# Patient Record
Sex: FEMALE | Race: ASIAN | Employment: UNEMPLOYED | URBAN - METROPOLITAN AREA
[De-identification: names, ages, dates, MRNs, and addresses within clinical notes are randomized per-mention and may not be internally consistent; named-entity substitution may affect disease eponyms.]

---

## 2024-01-10 PROBLEM — R10.13 ABDOMINAL PAIN, EPIGASTRIC: Status: ACTIVE | Noted: 2024-01-10

## 2024-01-10 PROBLEM — R11.0 NAUSEA: Status: ACTIVE | Noted: 2024-01-10

## 2024-01-10 PROBLEM — K21.9 GASTROESOPHAGEAL REFLUX DISEASE: Status: ACTIVE | Noted: 2024-01-10

## 2024-01-18 ENCOUNTER — HOSPITAL ENCOUNTER (EMERGENCY)
Age: 39
Discharge: HOME | End: 2024-01-18
Attending: EMERGENCY MEDICINE
Payer: COMMERCIAL

## 2024-01-18 ENCOUNTER — APPOINTMENT (OUTPATIENT)
Dept: CT IMAGING | Age: 39
End: 2024-01-18
Attending: NURSE PRACTITIONER
Payer: COMMERCIAL

## 2024-01-18 VITALS
HEART RATE: 61 BPM | HEIGHT: 64 IN | RESPIRATION RATE: 16 BRPM | TEMPERATURE: 98 F | BODY MASS INDEX: 16.73 KG/M2 | WEIGHT: 98 LBS | SYSTOLIC BLOOD PRESSURE: 99 MMHG | OXYGEN SATURATION: 100 % | DIASTOLIC BLOOD PRESSURE: 69 MMHG

## 2024-01-18 DIAGNOSIS — N20.0 KIDNEY STONE: Primary | ICD-10-CM

## 2024-01-18 DIAGNOSIS — R10.9 ABDOMINAL PAIN, ACUTE: ICD-10-CM

## 2024-01-18 LAB
ALBUMIN SERPL-MCNC: 3.7 G/DL (ref 3.4–5)
ALBUMIN/GLOB SERPL: 1 {RATIO} (ref 1–2)
ALP LIVER SERPL-CCNC: 59 U/L
ALT SERPL-CCNC: 34 U/L
ANION GAP SERPL CALC-SCNC: 7 MMOL/L (ref 0–18)
AST SERPL-CCNC: 23 U/L (ref 15–37)
BASOPHILS # BLD AUTO: 0.04 X10(3) UL (ref 0–0.2)
BASOPHILS NFR BLD AUTO: 0.5 %
BILIRUB SERPL-MCNC: 0.3 MG/DL (ref 0.1–2)
BUN BLD-MCNC: 12 MG/DL (ref 9–23)
CALCIUM BLD-MCNC: 9.1 MG/DL (ref 8.5–10.1)
CHLORIDE SERPL-SCNC: 108 MMOL/L (ref 98–112)
CO2 SERPL-SCNC: 24 MMOL/L (ref 21–32)
CREAT BLD-MCNC: 0.74 MG/DL
EGFRCR SERPLBLD CKD-EPI 2021: 107 ML/MIN/1.73M2 (ref 60–?)
EOSINOPHIL # BLD AUTO: 0.13 X10(3) UL (ref 0–0.7)
EOSINOPHIL NFR BLD AUTO: 1.7 %
ERYTHROCYTE [DISTWIDTH] IN BLOOD BY AUTOMATED COUNT: 12.3 %
GLOBULIN PLAS-MCNC: 3.7 G/DL (ref 2.8–4.4)
GLUCOSE BLD-MCNC: 95 MG/DL (ref 70–99)
HCT VFR BLD AUTO: 38 %
HGB BLD-MCNC: 12.5 G/DL
IMM GRANULOCYTES # BLD AUTO: 0.02 X10(3) UL (ref 0–1)
IMM GRANULOCYTES NFR BLD: 0.3 %
LIPASE SERPL-CCNC: 25 U/L (ref 13–75)
LYMPHOCYTES # BLD AUTO: 0.97 X10(3) UL (ref 1–4)
LYMPHOCYTES NFR BLD AUTO: 12.5 %
MCH RBC QN AUTO: 29 PG (ref 26–34)
MCHC RBC AUTO-ENTMCNC: 32.9 G/DL (ref 31–37)
MCV RBC AUTO: 88.2 FL
MONOCYTES # BLD AUTO: 0.31 X10(3) UL (ref 0.1–1)
MONOCYTES NFR BLD AUTO: 4 %
NEUTROPHILS # BLD AUTO: 6.29 X10 (3) UL (ref 1.5–7.7)
NEUTROPHILS # BLD AUTO: 6.29 X10(3) UL (ref 1.5–7.7)
NEUTROPHILS NFR BLD AUTO: 81 %
OSMOLALITY SERPL CALC.SUM OF ELEC: 288 MOSM/KG (ref 275–295)
PLATELET # BLD AUTO: 274 10(3)UL (ref 150–450)
POCT INFLUENZA A: NEGATIVE
POCT INFLUENZA B: NEGATIVE
POTASSIUM SERPL-SCNC: 4.2 MMOL/L (ref 3.5–5.1)
PROT SERPL-MCNC: 7.4 G/DL (ref 6.4–8.2)
RBC # BLD AUTO: 4.31 X10(6)UL
SARS-COV-2 RNA RESP QL NAA+PROBE: NOT DETECTED
SODIUM SERPL-SCNC: 139 MMOL/L (ref 136–145)
WBC # BLD AUTO: 7.8 X10(3) UL (ref 4–11)

## 2024-01-18 PROCEDURE — C9113 INJ PANTOPRAZOLE SODIUM, VIA: HCPCS | Performed by: NURSE PRACTITIONER

## 2024-01-18 PROCEDURE — 85025 COMPLETE CBC W/AUTO DIFF WBC: CPT

## 2024-01-18 PROCEDURE — 87502 INFLUENZA DNA AMP PROBE: CPT | Performed by: NURSE PRACTITIONER

## 2024-01-18 PROCEDURE — 96361 HYDRATE IV INFUSION ADD-ON: CPT

## 2024-01-18 PROCEDURE — 80053 COMPREHEN METABOLIC PANEL: CPT

## 2024-01-18 PROCEDURE — 83690 ASSAY OF LIPASE: CPT

## 2024-01-18 PROCEDURE — 96375 TX/PRO/DX INJ NEW DRUG ADDON: CPT

## 2024-01-18 PROCEDURE — 96374 THER/PROPH/DIAG INJ IV PUSH: CPT

## 2024-01-18 PROCEDURE — 74177 CT ABD & PELVIS W/CONTRAST: CPT | Performed by: NURSE PRACTITIONER

## 2024-01-18 PROCEDURE — 99284 EMERGENCY DEPT VISIT MOD MDM: CPT

## 2024-01-18 RX ORDER — MORPHINE SULFATE 4 MG/ML
4 INJECTION, SOLUTION INTRAMUSCULAR; INTRAVENOUS ONCE
Status: COMPLETED | OUTPATIENT
Start: 2024-01-18 | End: 2024-01-18

## 2024-01-18 RX ORDER — ACETAMINOPHEN 500 MG
1000 TABLET ORAL ONCE
Status: COMPLETED | OUTPATIENT
Start: 2024-01-18 | End: 2024-01-18

## 2024-01-18 RX ORDER — ONDANSETRON 2 MG/ML
4 INJECTION INTRAMUSCULAR; INTRAVENOUS ONCE
Status: COMPLETED | OUTPATIENT
Start: 2024-01-18 | End: 2024-01-18

## 2024-01-18 NOTE — ED INITIAL ASSESSMENT (HPI)
Reports headache started last noc and abd pain and nausea since this.  Reports has vomited one time today.  Was sent from physicians immediate care.

## 2024-01-19 NOTE — DISCHARGE INSTRUCTIONS
Encourage fliud intake     Tylenol or ibuprofen for pain     Continue taking all medications as prescribed by your GI and your primary care doctor.     Follow up with your primary care MD in 2-3 days     Return to the ER with any worsening symptoms or concerns

## 2024-02-02 ENCOUNTER — HOSPITAL ENCOUNTER (OUTPATIENT)
Dept: MRI IMAGING | Age: 39
Discharge: HOME | End: 2024-02-02
Attending: Other
Payer: COMMERCIAL

## 2024-02-02 DIAGNOSIS — M79.7 FIBROMYALGIA: ICD-10-CM

## 2024-02-02 PROCEDURE — 70551 MRI BRAIN STEM W/O DYE: CPT | Performed by: OTHER

## 2024-03-31 NOTE — ED PROVIDER NOTES
Patient Seen in: [facility] In [place]    History     Chief Complaint   Patient presents with    Abdomen/Flank Pain    Nausea/Vomiting/Diarrhea    Headache     Stated Complaint: abdominal pain, nausea, headache all day    HPI    Patient complains of abdominal pain, nausea, and headache that began earlier today.   Pt reports that she has been havaing burning pain and discomfort in her abdomen since having an endoscopy earlier this week.  Reports that she was told she should be taking omeprazole.  .  Pain described as burning pain in the abdomen.  No significant epigastric pain.  Pain rated as 6/10.  Modifying factors include: pt tried Pantoprazole with little relief of symptoms.        Past Medical History:   Diagnosis Date    Abdominal distention     Abdominal pain     Back pain     Bloating     Flatulence/gas pain/belching     Nausea     Sleep disturbance     Uncomfortable fullness after meals     Weight loss        History reviewed. No pertinent surgical history.         No family history on file.    Social History     Socioeconomic History    Marital status:    Tobacco Use    Smoking status: Never    Smokeless tobacco: Never   Vaping Use    Vaping Use: Never used   Substance and Sexual Activity    Alcohol use: Never    Drug use: Never       Review of Systems    Positive for stated complaint: abdominal pain, nausea, headache all day  Other systems are as noted in HPI.  Constitutional and vital signs reviewed.      All other systems reviewed and negative except as noted above.    PSFH elements reviewed from today and agreed except as otherwise stated in HPI.    Physical Exam     ED Triage Vitals [01/18/24 1726]   /70   Pulse 90   Resp 20   Temp 97.8 °F (36.6 °C)   Temp src Temporal   SpO2 100 %   O2 Device None (Room air)       Current:BP 99/69   Pulse 61   Temp 97.8 °F (36.6 °C) (Temporal)   Resp 16   Ht 162.6 cm (5' 4\")   Wt 44.5 kg   LMP 01/13/2024   SpO2 100%   BMI  16.82 kg/m²   Pulse ox 100% on RA         Physical Exam  General Appearance: alert, no distress  Eyes: pupils equal and round no pallor or injection  ENT, Mouth: mucous membranes moist  Respiratory: there are no retractions, lungs are clear to auscultation  Cardiovascular: regular rate and rhythm    Gastrointestinal: soft, non tender, no mass, normal bowel sounds, no swelling, no ecchymosis, no pain at mcburney's point, negative conn sign.  Neurological: II-XII grossly intact  no focal deficits  Skin: warm and dry, no rashes.  Musculoskeletal: neck is supple non tender        Extremities are symmetrical, full range of motion  Psychiatric: patient is pleasant, there is no agitation         ED Course     Labs Reviewed   CBC W/ DIFFERENTIAL - Abnormal; Notable for the following components:       Result Value    Lymphocyte Absolute 0.97 (*)     All other components within normal limits   COMP METABOLIC PANEL (14) - Normal   LIPASE - Normal   POCT FLU TEST - Normal    Narrative:     This assay is a rapid molecular in vitro test utilizing nucleic acid amplification of influenza A and B viral RNA.   RAPID SARS-COV-2 BY PCR - Normal   CBC WITH DIFFERENTIAL WITH PLATELET    Narrative:     The following orders were created for panel order CBC With Differential With Platelet.  Procedure                               Abnormality         Status                     ---------                               -----------         ------                     CBC W/ DIFFERENTIAL[[id number]]          Abnormal            Final result                 Please view results for these tests on the individual orders.   RAINBOW DRAW LAVENDER   RAINBOW DRAW LIGHT GREEN     I have personally  reviewed available prior medical records for any recent pertinent discharge summaries/testing. Patient/family updated on results and plan, a verbalized understanding and agreement with the plan.  I explained to the patient that emergent conditions may arise and  to go to the ER for new, worsening or any persistent conditions. I've explained the importance of taking all medicatons as prescribed, follow up, and return precuations,  All questions answered.    Please note that this report has been produced using speech recognition software and may contain errors related to that system including, but not limited to, errors in grammar, punctuation, and spelling, as well as words and phrases that possibly may have been recognized inappropriately.  If there are any questions or concerns, contact the dictating provider for clarification.  MDM       Patient complains of abdominal pain, nausea, and headache that began earlier today.   Pt reports that she has been havaing burning pain and discomfort in her abdomen since having an endoscopy earlier this week.  Reports that she was told she should be taking omeprazole.  .  Pain described as burning pain in the abdomen.  No significant epigastric pain.  Pain rated as 6/10.  Modifying factors include: pt tried Pantoprazole with little relief of symptoms.     Differential diagnosis includes esophageal perforation, gastroenteritis, GERD, UTI    All labs were reviewed, CT and labs were discussed with the patient and .  Patient is feeling better after fluids, Tylenol.  I did discuss return and follow-up precautions.  Patient was discharged home in stable condition to follow-up with primary care physician and to return to the emergency department with any worsening symptoms or concerns  Radiology Interpretation:  CT ABDOMEN PELVIS IV CONTRAST, NO ORAL (ER)    Result Date: 1/18/2024  CONCLUSION:  1. No acute process. 2. Punctate nonobstructing stone within a right superior pole calyx measuring 2 mm.   LOCATION:  [facility]   Dictated by (CST): Carlton Colunga DO on 1/18/2024 at 7:56 PM     Finalized by (CST): Carlton Colunga DO on 1/18/2024 at 7:59 PM              Disposition and Plan     Clinical Impression:  1. Kidney stone    2. Abdominal  pain, acute        Disposition:  Discharge    Follow-up:  Benedicto Loya,   [address]  [address]  [facility] [id number] [phone]    Follow up        Medications Prescribed:  Discharge Medication List as of 1/18/2024  8:51 PM                     hard copy, drawn during this pregnancy

## 2024-04-23 ENCOUNTER — LAB ENCOUNTER (OUTPATIENT)
Dept: LAB | Age: 39
End: 2024-04-23
Attending: STUDENT IN AN ORGANIZED HEALTH CARE EDUCATION/TRAINING PROGRAM
Payer: COMMERCIAL

## 2024-06-17 ENCOUNTER — LAB ENCOUNTER (OUTPATIENT)
Dept: LAB | Age: 39
End: 2024-06-17
Attending: STUDENT IN AN ORGANIZED HEALTH CARE EDUCATION/TRAINING PROGRAM

## 2024-06-17 DIAGNOSIS — A04.8 H. PYLORI INFECTION: ICD-10-CM

## 2024-06-17 PROCEDURE — 83013 H PYLORI (C-13) BREATH: CPT

## 2024-06-18 LAB — H PYLORI BREATH TEST: POSITIVE

## 2024-08-22 ENCOUNTER — APPOINTMENT (OUTPATIENT)
Dept: CT IMAGING | Age: 39
End: 2024-08-22
Attending: NURSE PRACTITIONER
Payer: COMMERCIAL

## 2024-08-22 ENCOUNTER — HOSPITAL ENCOUNTER (EMERGENCY)
Age: 39
Discharge: HOME | End: 2024-08-22
Payer: COMMERCIAL

## 2024-08-22 VITALS
BODY MASS INDEX: 19 KG/M2 | OXYGEN SATURATION: 100 % | WEIGHT: 110 LBS | DIASTOLIC BLOOD PRESSURE: 79 MMHG | RESPIRATION RATE: 18 BRPM | HEART RATE: 69 BPM | SYSTOLIC BLOOD PRESSURE: 117 MMHG | TEMPERATURE: 98 F

## 2024-08-22 DIAGNOSIS — N20.0 KIDNEY STONE: Primary | ICD-10-CM

## 2024-08-22 LAB
ALBUMIN SERPL-MCNC: 3.5 G/DL (ref 3.4–5)
ALBUMIN/GLOB SERPL: 1 {RATIO} (ref 1–2)
ALP LIVER SERPL-CCNC: 56 U/L
ALT SERPL-CCNC: 29 U/L
ANION GAP SERPL CALC-SCNC: 4 MMOL/L (ref 0–18)
AST SERPL-CCNC: 19 U/L (ref 15–37)
B-HCG UR QL: NEGATIVE
BASOPHILS # BLD AUTO: 0.04 X10(3) UL (ref 0–0.2)
BASOPHILS NFR BLD AUTO: 0.4 %
BILIRUB SERPL-MCNC: 0.4 MG/DL (ref 0.1–2)
BILIRUB UR QL STRIP.AUTO: NEGATIVE
BUN BLD-MCNC: 10 MG/DL (ref 9–23)
CALCIUM BLD-MCNC: 9.1 MG/DL (ref 8.5–10.1)
CHLORIDE SERPL-SCNC: 110 MMOL/L (ref 98–112)
CO2 SERPL-SCNC: 25 MMOL/L (ref 21–32)
COLOR UR AUTO: YELLOW
CREAT BLD-MCNC: 0.76 MG/DL
EGFRCR SERPLBLD CKD-EPI 2021: 104 ML/MIN/1.73M2 (ref 60–?)
EOSINOPHIL # BLD AUTO: 0.11 X10(3) UL (ref 0–0.7)
EOSINOPHIL NFR BLD AUTO: 1.2 %
ERYTHROCYTE [DISTWIDTH] IN BLOOD BY AUTOMATED COUNT: 12.5 %
GLOBULIN PLAS-MCNC: 3.5 G/DL (ref 2.8–4.4)
GLUCOSE BLD-MCNC: 94 MG/DL (ref 70–99)
GLUCOSE UR STRIP.AUTO-MCNC: NEGATIVE MG/DL
HCT VFR BLD AUTO: 35.3 %
HGB BLD-MCNC: 12 G/DL
IMM GRANULOCYTES # BLD AUTO: 0.03 X10(3) UL (ref 0–1)
IMM GRANULOCYTES NFR BLD: 0.3 %
KETONES UR STRIP.AUTO-MCNC: 15 MG/DL
LEUKOCYTE ESTERASE UR QL STRIP.AUTO: NEGATIVE
LYMPHOCYTES # BLD AUTO: 1.33 X10(3) UL (ref 1–4)
LYMPHOCYTES NFR BLD AUTO: 14.9 %
MCH RBC QN AUTO: 29.6 PG (ref 26–34)
MCHC RBC AUTO-ENTMCNC: 34 G/DL (ref 31–37)
MCV RBC AUTO: 86.9 FL
MONOCYTES # BLD AUTO: 0.61 X10(3) UL (ref 0.1–1)
MONOCYTES NFR BLD AUTO: 6.8 %
NEUTROPHILS # BLD AUTO: 6.82 X10 (3) UL (ref 1.5–7.7)
NEUTROPHILS # BLD AUTO: 6.82 X10(3) UL (ref 1.5–7.7)
NEUTROPHILS NFR BLD AUTO: 76.4 %
NITRITE UR QL STRIP.AUTO: NEGATIVE
OSMOLALITY SERPL CALC.SUM OF ELEC: 287 MOSM/KG (ref 275–295)
PH UR STRIP.AUTO: 5.5 [PH] (ref 5–8)
PLATELET # BLD AUTO: 312 10(3)UL (ref 150–450)
POTASSIUM SERPL-SCNC: 3.9 MMOL/L (ref 3.5–5.1)
PROT SERPL-MCNC: 7 G/DL (ref 6.4–8.2)
PROT UR STRIP.AUTO-MCNC: NEGATIVE MG/DL
RBC # BLD AUTO: 4.06 X10(6)UL
RBC #/AREA URNS AUTO: >10 /HPF
SODIUM SERPL-SCNC: 139 MMOL/L (ref 136–145)
SP GR UR STRIP.AUTO: >=1.03 (ref 1–1.03)
UROBILINOGEN UR STRIP.AUTO-MCNC: 0.2 MG/DL
WBC # BLD AUTO: 8.9 X10(3) UL (ref 4–11)

## 2024-08-22 PROCEDURE — 81025 URINE PREGNANCY TEST: CPT

## 2024-08-22 PROCEDURE — 99285 EMERGENCY DEPT VISIT HI MDM: CPT

## 2024-08-22 PROCEDURE — 99284 EMERGENCY DEPT VISIT MOD MDM: CPT

## 2024-08-22 PROCEDURE — 96374 THER/PROPH/DIAG INJ IV PUSH: CPT

## 2024-08-22 PROCEDURE — 81015 MICROSCOPIC EXAM OF URINE: CPT | Performed by: NURSE PRACTITIONER

## 2024-08-22 PROCEDURE — 74176 CT ABD & PELVIS W/O CONTRAST: CPT | Performed by: NURSE PRACTITIONER

## 2024-08-22 PROCEDURE — 85025 COMPLETE CBC W/AUTO DIFF WBC: CPT | Performed by: NURSE PRACTITIONER

## 2024-08-22 PROCEDURE — 96375 TX/PRO/DX INJ NEW DRUG ADDON: CPT

## 2024-08-22 PROCEDURE — 81001 URINALYSIS AUTO W/SCOPE: CPT | Performed by: NURSE PRACTITIONER

## 2024-08-22 PROCEDURE — 80053 COMPREHEN METABOLIC PANEL: CPT | Performed by: NURSE PRACTITIONER

## 2024-08-22 RX ORDER — ONDANSETRON 2 MG/ML
4 INJECTION INTRAMUSCULAR; INTRAVENOUS ONCE
Status: COMPLETED | OUTPATIENT
Start: 2024-08-22 | End: 2024-08-22

## 2024-08-22 RX ORDER — DIAZEPAM 10 MG/2ML
5 INJECTION, SOLUTION INTRAMUSCULAR; INTRAVENOUS ONCE
Status: COMPLETED | OUTPATIENT
Start: 2024-08-22 | End: 2024-08-22

## 2024-08-22 RX ORDER — ONDANSETRON 4 MG/1
4 TABLET, ORALLY DISINTEGRATING ORAL EVERY 4 HOURS PRN
Qty: 30 TABLET | Refills: 0 | Status: SHIPPED | OUTPATIENT
Start: 2024-08-22

## 2024-08-22 RX ORDER — KETOROLAC TROMETHAMINE 30 MG/ML
15 INJECTION, SOLUTION INTRAMUSCULAR; INTRAVENOUS ONCE
Status: COMPLETED | OUTPATIENT
Start: 2024-08-22 | End: 2024-08-22

## 2024-08-22 RX ORDER — NAPROXEN 500 MG/1
500 TABLET ORAL 2 TIMES DAILY PRN
Qty: 30 TABLET | Refills: 0 | Status: SHIPPED | OUTPATIENT
Start: 2024-08-22

## 2024-08-22 NOTE — ED PROVIDER NOTES
History     Chief Complaint   Patient presents with    Back Pain       Subjective:   HPI    Vannessa Payan, 38 year old female with notable medical history of abdominal pain who presents with lower back and abdominal pain. Patient reports sudden onset of Right lower back pain with radiation to Right abdomen. Denies precipitating events, falls, trauma, fever. She reports associated urinary changes (frequency and mild pain). She also reports starting her menses last night and has Hx painful menses.    Per chart review, patient had similar complaints in 2024 w/ benign workup. Patient was also Dx with H. Pylori on 2024.        Objective:   Past Medical History:    Abdominal distention    Abdominal pain    Back pain    Bloating    Flatulence/gas pain/belching    Nausea    Sleep disturbance    Uncomfortable fullness after meals    Weight loss              History reviewed. No pertinent surgical history.             Social History     Socioeconomic History    Marital status:    Tobacco Use    Smoking status: Never    Smokeless tobacco: Never   Vaping Use    Vaping status: Never Used   Substance and Sexual Activity    Alcohol use: Never    Drug use: Never              No current facility-administered medications on file prior to encounter.     Current Outpatient Medications on File Prior to Encounter   Medication Sig Dispense Refill    [] levoFLOXacin (LEVAQUIN) 500 MG Oral Tab Take 1 tablet (500 mg total) by mouth daily for 14 days. 14 tablet 0    [] amoxicillin 250 MG Oral Cap Take 3 capsules (750 mg total) by mouth 3 (three) times daily for 14 days. 126 capsule 0    Omeprazole 40 MG Oral Capsule Delayed Release Take 1 tablet PO twice daily, half an hour before breakfast and half an hour before dinner. 28 capsule 0    GABAPENTIN OR Take by mouth.      aspirin-acetaminophen-caffeine 250-250-65 MG Oral Tab Take 1 tablet by mouth every 6 (six) hours as needed for Pain.      pantoprazole  40 MG Oral Tab EC Take one tablet (40 mg total) by mouth once daily, 30 minutes prior to breakfast. 90 tablet 0         Review of Systems   Gastrointestinal:  Positive for abdominal pain.   Musculoskeletal:  Positive for back pain.   All other systems reviewed and are negative.        Constitutional and vital signs reviewed.      All other systems reviewed and negative except as noted above.    I have reviewed the family history, social history, allergies, and outpatient medications.     History reviewed from EMR: Encounters, problem list, allergies, medications      Physical Exam     ED Triage Vitals   BP 08/22/24 1132 107/68   Pulse 08/22/24 1126 80   Resp 08/22/24 1126 18   Temp 08/22/24 1130 97.8 °F (36.6 °C)   Temp src 08/22/24 1130 Temporal   SpO2 08/22/24 1126 100 %   O2 Device 08/22/24 1126 None (Room air)       Current:/79   Pulse 69   Temp 97.8 °F (36.6 °C) (Temporal)   Resp 18   Wt 49.9 kg   LMP 08/21/2024   SpO2 100%   BMI 18.88 kg/m²       Physical Exam  Vitals and nursing note reviewed.   Constitutional:       General: She is in acute distress.      Appearance: Normal appearance. She is normal weight. She is not ill-appearing or toxic-appearing.      Comments: Appears in pain, will not lay still   HENT:      Head: Normocephalic and atraumatic.      Right Ear: External ear normal.      Left Ear: External ear normal.      Nose: Nose normal.      Mouth/Throat:      Mouth: Mucous membranes are moist.   Eyes:      Extraocular Movements: Extraocular movements intact.      Conjunctiva/sclera: Conjunctivae normal.      Pupils: Pupils are equal, round, and reactive to light.   Cardiovascular:      Rate and Rhythm: Normal rate.      Pulses: Normal pulses.   Pulmonary:      Effort: Pulmonary effort is normal. No respiratory distress.   Abdominal:      Comments: Soft, non-rigid abdomen   Musculoskeletal:         General: No swelling, tenderness or signs of injury. Normal range of motion.      Cervical  back: Normal range of motion and neck supple.        Back:       Comments: Tender to very light palpation of Right flank and lower back. No signs of injury. No acute skin changes.  +tenderness to Right lateral thigh w/o acute skin changes.   Skin:     General: Skin is warm and dry.      Capillary Refill: Capillary refill takes less than 2 seconds.      Coloration: Skin is not jaundiced.   Neurological:      General: No focal deficit present.      Mental Status: She is alert and oriented to person, place, and time. Mental status is at baseline.   Psychiatric:         Mood and Affect: Mood normal.         Behavior: Behavior normal.         Thought Content: Thought content normal.         Judgment: Judgment normal.            ED Course     Labs Reviewed   URINALYSIS WITH CULTURE REFLEX - Abnormal; Notable for the following components:       Result Value    Clarity Urine Hazy (*)     Ketones Urine 15 (*)     Blood Urine Large (*)     All other components within normal limits   COMP METABOLIC PANEL (14) - Normal   POCT PREGNANCY URINE - Normal   CBC WITH DIFFERENTIAL WITH PLATELET   UA MICROSCOPIC ONLY, URINE     CT ABDOMEN+PELVIS(CPT=74176)   Final Result   PROCEDURE:  CT ABDOMEN+PELVIS (CPT=74176)       COMPARISON:  [facility], CT, CT ABDOMEN PELVIS IV CONTRAST, NO ORAL (ER),    1/18/2024, 6:43 PM.       INDICATIONS:  sudden and severe Right flank pain radiating to front    abdomen       TECHNIQUE:  Unenhanced multislice CT scanning was performed from the dome    of the diaphragm to the pubic symphysis.  Dose reduction techniques were    used. Dose information is transmitted to the ACR (American College of    Radiology) NRDR (National Radiology    Data Registry) which includes the Dose Index Registry.       PATIENT STATED HISTORY: (As transcribed by Technologist)  Patient has    nausea, vomiting, diarrhea and back pain, started last night.            FINDINGS:     LIVER:  No enlargement, atrophy, abnormal density, or  significant focal    lesion.     BILIARY:  Gallbladder is surgically absent.   PANCREAS:  No lesion, fluid collection, ductal dilatation, or atrophy.     SPLEEN:  No enlargement or focal lesion.     KIDNEYS:  Mild right hydroureter is noted with a calculus in the region of    the right bladder trigone measuring up to 3 millimeters (image 101).     Evaluation of the right kidney is limited due to respiratory motion    artifact.  No definite calculi are seen    within the bilateral kidneys.  No renal masses are present.     ADRENALS:  No mass or enlargement.     AORTA/VASCULAR:    Unremarkable as seen on non-contrast imaging.    RETROPERITONEUM:  No mass or adenopathy.     BOWEL/MESENTERY:  No visible mass, obstruction, or bowel wall thickening.        ABDOMINAL WALL:  No mass or hernia.     URINARY BLADDER:  A calculus is seen in the region of the right bladder    trigone.     PELVIC NODES:  No adenopathy.     PELVIC ORGANS:  No visible mass.  Pelvic organs appropriate for patient    age.     BONES:  No bony lesion or fracture.     LUNG BASES:  No visible pulmonary or pleural disease.     OTHER:  Negative.                           =====   CONCLUSION:     1. Mild right hydronephrosis with a calculus in the right bladder trigone    likely represents a recently parts calculus.           LOCATION:  [facility]           Dictated by (CST): Vincenzo Winter MD on 8/22/2024 at 2:02 PM        Finalized by (CST): Vincenzo Winter MD on 8/22/2024 at 2:06 PM             Vitals:    08/22/24 1130 08/22/24 1132 08/22/24 1238 08/22/24 1325   BP:  107/68 107/70 117/79   Pulse:   50 69   Resp:   16 18   Temp: 97.8 °F (36.6 °C)      TempSrc: Temporal      SpO2:   100% 100%   Weight:                MDM        Vannessa Payan, 38 year old female with medical history as noted above who presents with Right lower back and abdominal pain   - Patient in distress r/t pain, VSS, unable to lay still   - obstructing stone vs UTI vs appendicitis vs  myofascial vs referred menstrual pain vs endometriosis vs other   - NPO, labs, Ketorolac, CT abdomen/pelvis        ** See ED course below for additional information on care provided / interventions / notable events throughout patient's encounter.    ED Course as of 08/22/24 1510  ------------------------------------------------------------  Time: 08/22 1259  Comment: Patient having outbursts of yelling (r/t pain) and riving around in bed. Patient also grabbing her Right flank / hip.  There are also periods where patient is lying still in bed  Labs WNL and conveyed to patient &   Additional medications ordered  Awaiting CT  ------------------------------------------------------------  Time: 08/22 1428  Comment: CT notable for calculus in bladder, likely d/t recently passed stone  Patient sleeping in NAD  Will attempt urine collection prior to d/c  Will provide with strainer and sterile container  Supportive care discussed  ------------------------------------------------------------  Time: 08/22 1510  Comment: UA w/o leuks or nitrites; not c/w infection         ** I have independently reviewed the radiology images, clinical lab results, and ECG tracings as described above (if applicable)    ** Concerning co-morbidities possibly affecting complaint / care: Hx recent H. Pylori infection, Hx painful menses    ** See below for home care instructions (if applicable)          Medical Decision Making  Amount and/or Complexity of Data Reviewed  Independent Historian: spouse     Details:  (with permission)  External Data Reviewed: labs and radiology.     Details: Labs and imaging from 1/18/24 reviewed; punctate nonobstructive stone in pole of kidney  Labs: ordered. Decision-making details documented in ED Course.  Radiology: ordered and independent interpretation performed. Decision-making details documented in ED Course.        Disposition and Plan     Clinical Impression:  1. Kidney stone          Disposition:  Discharge  8/22/2024  3:10 pm    Follow-up:  Benedicto Loya DO  [address]  [address]  [facility] [id number] [phone]    Follow up  As needed    Heriberto Turner MD  [address]  [address]  [facility] [address]  [phone]    Follow up  Urology contact        Medications Prescribed:  Current Discharge Medication List        START taking these medications    Details   naproxen 500 MG Oral Tab Take 1 tablet (500 mg total) by mouth 2 (two) times daily as needed (pain).  Qty: 30 tablet, Refills: 0      ondansetron 4 MG Oral Tablet Dispersible Take 1 tablet (4 mg total) by mouth every 4 (four) hours as needed for Nausea.  Qty: 30 tablet, Refills: 0             The above patient (and/or guardian) was made aware that an appropriate evaluation has been performed, and that no additional testing is required at this time. In my medical judgment, there is currently no evidence of an immediate life-threatening or surgical condition, therefore discharge is indicated at this time. The patient (and/or guardian) was advised that a small risk still exists that a serious condition could develop. The patient was instructed to arrange close follow-up with their primary care provider (or the referral provider given today). The patient received written and verbal instructions regarding their condition / concerns, demonstrated understanding, and is agreement with the outpatient treatment plan.        Home care instructions:     - Naproxen as needed for pain   - Zofran as needed for nausea   - Increase water intake   - Take multivitamins and Vitamin D daily   - Use urine strainer whenever possible to try and collect stone. If collected, place in sterile container and follow up with Urology      Francis Zeng, DNP, APRN, AGACNP-BC, FNP-C, CNL  Adult-Gerontology Acute Care & Family Nurse Practitioner  [facility]

## 2024-08-22 NOTE — ED QUICK NOTES
This RN went to see if the pt can get up to provide a urine sample. Pt refusing to get up. Pt states that she is still having a lot of pain in her right hip area. Family requesting that the patient have more  time before trying to get up to the bathroom.

## 2024-08-22 NOTE — ED QUICK NOTES
Rounding Completed    Plan of Care reviewed. Waiting for pt to go to CT.  Elimination needs assessed, pt states that she is unable to urinate at this time.  Pt is no longer yelling out at this time, laying in a position of comfort, but states that her pain is still a 10/10. ER provider aware, no further orders at this time    Bed is locked and in lowest position. Call light within reach.

## 2024-08-22 NOTE — ED INITIAL ASSESSMENT (HPI)
Pt states that she started with back pain that radiates into the abdomen. Pt states that she started with her period last night. Pt states that she gets very painful periods lately. Pt had an US ordered from her doctor, but has not gone for the test. Pt states that she has had diarrhea, n/v.

## 2024-08-22 NOTE — ED QUICK NOTES
Pt states that she is not pregnant. Per the NP the pt can receive the Toradol. Pt is rolling around on the cart yelling out.

## 2024-08-22 NOTE — ED QUICK NOTES
Went to room to ask patient is she could give a urine sample and family stated that she was sleeping and to let her rest.

## 2024-08-22 NOTE — DISCHARGE INSTRUCTIONS
- Naproxen as needed for pain   - Zofran as needed for nausea   - Increase water intake   - Take multivitamins and Vitamin D daily   - Use urine strainer whenever possible to try and collect stone. If collected, place in sterile container and follow up with Urology

## 2025-01-07 ENCOUNTER — V-VISIT (OUTPATIENT)
Dept: URGENT CARE | Age: 40
End: 2025-01-07

## 2025-01-07 VITALS
HEART RATE: 90 BPM | TEMPERATURE: 97.1 F | RESPIRATION RATE: 16 BRPM | WEIGHT: 110 LBS | SYSTOLIC BLOOD PRESSURE: 123 MMHG | DIASTOLIC BLOOD PRESSURE: 70 MMHG | OXYGEN SATURATION: 100 % | HEIGHT: 64 IN | BODY MASS INDEX: 18.78 KG/M2

## 2025-01-07 DIAGNOSIS — B96.89 ACUTE BACTERIAL SINUSITIS: Primary | ICD-10-CM

## 2025-01-07 DIAGNOSIS — L30.9 ECZEMA, UNSPECIFIED TYPE: ICD-10-CM

## 2025-01-07 DIAGNOSIS — J01.90 ACUTE BACTERIAL SINUSITIS: Primary | ICD-10-CM

## 2025-01-07 PROCEDURE — 99203 OFFICE O/P NEW LOW 30 MIN: CPT | Performed by: NURSE PRACTITIONER

## 2025-01-07 RX ORDER — BETAMETHASONE DIPROPIONATE 0.5 MG/G
CREAM TOPICAL
Qty: 45 G | Refills: 0 | Status: SHIPPED | OUTPATIENT
Start: 2025-01-07

## 2025-01-07 ASSESSMENT — ENCOUNTER SYMPTOMS
CHILLS: 1
HEADACHES: 1
FEVER: 1
COUGH: 0
NAUSEA: 1
SORE THROAT: 0
VOMITING: 1
ABDOMINAL PAIN: 0

## 2025-01-07 ASSESSMENT — PAIN SCALES - GENERAL: PAINLEVEL: 3
